# Patient Record
Sex: FEMALE | Race: BLACK OR AFRICAN AMERICAN | NOT HISPANIC OR LATINO | URBAN - METROPOLITAN AREA
[De-identification: names, ages, dates, MRNs, and addresses within clinical notes are randomized per-mention and may not be internally consistent; named-entity substitution may affect disease eponyms.]

---

## 2021-05-27 ENCOUNTER — OFFICE VISIT (OUTPATIENT)
Dept: URBAN - METROPOLITAN AREA CLINIC 40 | Facility: CLINIC | Age: 81
End: 2021-05-27
Payer: MEDICARE

## 2021-05-27 ENCOUNTER — LAB OUTSIDE AN ENCOUNTER (OUTPATIENT)
Dept: URBAN - METROPOLITAN AREA CLINIC 40 | Facility: CLINIC | Age: 81
End: 2021-05-27

## 2021-05-27 ENCOUNTER — DASHBOARD ENCOUNTERS (OUTPATIENT)
Age: 81
End: 2021-05-27

## 2021-05-27 DIAGNOSIS — Z79.01 BLOOD THINNED DUE TO LONG-TERM ANTICOAGULANT USE: ICD-10-CM

## 2021-05-27 DIAGNOSIS — I48.0 PAROXYSMAL ATRIAL FIBRILLATION: ICD-10-CM

## 2021-05-27 DIAGNOSIS — K76.9 LIVER LESION: ICD-10-CM

## 2021-05-27 DIAGNOSIS — K62.5 RECTAL BLEEDING: ICD-10-CM

## 2021-05-27 DIAGNOSIS — R19.5 HEME POSITIVE STOOL: ICD-10-CM

## 2021-05-27 DIAGNOSIS — K76.89 ABNORMAL FINDING ON LIVER FUNCTION: ICD-10-CM

## 2021-05-27 PROBLEM — 282825002: Status: ACTIVE | Noted: 2021-05-27

## 2021-05-27 PROBLEM — 300331000: Status: ACTIVE | Noted: 2021-05-27

## 2021-05-27 PROBLEM — 711150003: Status: ACTIVE | Noted: 2021-05-27

## 2021-05-27 PROCEDURE — 99204 OFFICE O/P NEW MOD 45 MIN: CPT | Performed by: INTERNAL MEDICINE

## 2021-05-27 PROCEDURE — 99244 OFF/OP CNSLTJ NEW/EST MOD 40: CPT | Performed by: INTERNAL MEDICINE

## 2021-05-27 RX ORDER — FUROSEMIDE 40 MG/1
1 TABLET TABLET ORAL ONCE A DAY
Status: ACTIVE | COMMUNITY

## 2021-05-27 RX ORDER — CLOPIDOGREL BISULFATE 75 MG/1
1 TABLET TABLET ORAL ONCE A DAY
Status: ACTIVE | COMMUNITY

## 2021-05-27 RX ORDER — ASPIRIN 81 MG/1
1 TABLET TABLET, COATED ORAL ONCE A DAY
Status: ACTIVE | COMMUNITY

## 2021-05-27 RX ORDER — CLONIDINE HYDROCHLORIDE 0.3 MG/1
1 TABLET TABLET ORAL ONCE A DAY
Status: ACTIVE | COMMUNITY

## 2021-05-27 RX ORDER — CYCLOBENZAPRINE HYDROCHLORIDE 10 MG/1
1 TABLET AT BEDTIME AS NEEDED TABLET, FILM COATED ORAL ONCE A DAY
Status: DISCONTINUED | COMMUNITY

## 2021-05-27 RX ORDER — METHIMAZOLE 10 MG/1
1 TABLET TABLET ORAL ONCE A DAY
Status: ACTIVE | COMMUNITY

## 2021-05-27 NOTE — HPI-TODAY'S VISIT:
PT referred by Dr. Terese Yoo. A copy of this note will be provided for review. CBC and Ferritin and MCV normal 3/21. CBC last week normal. When she went to the bathroom this morning, she had bright red blood diarrhea on the stool. She saw blood when she wiped. Since then, she went to the bathroom several times. She called her GI , but the earliest appointment was in June.   Hx benign liver lesion 2019.  ---- TECHNIQUE: Following IV administration of 82 mL of Omnipaque 350, CT scan of the abdomen and pelvis with multiplanar reformatted images generated from the data set. Dose reduction techniques were utilized.    COMPARISON: No comparison studies are available at this time.   FINDINGS:    Lower chest: No evidence of abnormality.   Liver: There is a 1.5 cm simple cyst in segment four. There is a tiny hypodensity at the hepatic dome which is indeterminate; image 13. There is a 2.3 x 0.9 cm hypoattenuating lesion in the lesion in The inferior lobe which is slightly higher in attenuation than simple fluid.   Gallbladder: The gallbladder is surgically absent.    Pancreas: Normal.    Spleen: Normal. Splenule noted.   Adrenals: Normal.   Kidneys: There is an 8 mm fat density lesion with minimal enhancement in the posterior right kidney which may represent an angiomyolipoma or a cortical scar. There is a tiny hypodensity in the lower pole of the right kidney which is too small to  characterize however this is statistically likely to represent a cyst. The left kidney appears normal.   Gastrointestinal: The stomach and small bowel appear normal. There is diverticulosis without evidence of diverticulitis.  The appendix is normal in appearance.   Pelvis: The uterus is surgically absent. The urinary bladder appears normal. The ovaries appear normal.   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 There are calcifications at the origin of the more cephalad right renal artery. There is nonflow limiting calcification at the origin of the superior mesenteric artery. There are calcifications of the  left renal artery likely without any hemodynamic significance. There is ectasia of the infrarenal abdominal aorta with a maximum diameter of 2.8 cm. There is a polypoid intraluminal structure at the junction of the thoracic and abdominal aorta; series 6,  image 47.   Lymph nodes: No enlarged lymph nodes.    Bones: No destructive osseous lesion. There are severe degenerative changes of the pubic symphysis. There are mild degenerative changes of both hips; right greater than left. There are scattered mild degenerative changes of the thoracolumbar spine. There  are moderate severe degenerative changes of the sacroiliac joints bilaterally.   IMPRESSION: .         .   Polypoid structure within the aorta at the junction of the abdomen and thorax. This likely represents an atypical presentation of noncalcified atheromatous disease, however the diagnosis of of an uncommon tumor such as an angiosarcoma cannot be excluded.   2.3 x 1.0 cm cystic lesion in the medial/inferior aspect of the right lobe which is slightly higher in density than simple fluid. This is likely benign however further characterization with MRI, ultrasound, or triple phase CT should be considered.   Diverticulosis without evidence of diverticulitis.   The Results Reporting Office (F1) will complete appropriate follow-up actions based on defined processes. F1   Released By: JERAMY REILLY MD 9/6/2019 12:21 PM ---- EXAM:   MRI ABDOMEN W+WO IV CONTRAST(CREATININE DRAW IF NEEDED)   CLINICAL INDICATION:  R63.4 (Abnormal weight loss) .  Liver lesion on CT . TECHNIQUE:  Multiplanar multisequence images were obtained through the abdomen without and with IV administration of 5 mL MultiHance.   COMPARISON: 9/6/2019   FINDINGS:     Lower chest:  No evidence of abnormality. Lungs not well assessed with this modality.   Liver:  Multiple T2 bright hepatic lesions show no evidence of enhancement on postcontrast imaging consistent with cysts. The lesion previously noted in the medial aspect of the right lower liver is consistent with a cyst measuring around 20 x 13 mm.   Gallbladder:  Surgically absent. Bile duct is within normal size limits for the postcholecystectomy patient.    Pancreas: Normal.    Spleen:  Motion artifact but no evidence of worrisome lesion.   Adrenals:  Normal.   Kidneys:  Small Bosniak 1 cyst in the right kidney. No worrisome renal lesions.   Gastrointestinal:  No evidence of abnormality. Exam not optimized for bowel assessment.     Vasculature:  Atherosclerotic vascular disease with some ulcerated plaque in the aorta. Abdominal aorta measures up to 26 mm. Portal vein, splenic vein and superior mesenteric veins enhance normally.   Lymph nodes:  No enlarged lymph nodes.    Bones:  No suspicious lesion present.   IMPRESSION: .         .   Hepatic and renal cysts. No evidence of worrisome lesion.       Released By: LORRIE MORENO MD 1/28/2020 11:32 AM

## 2021-05-27 NOTE — PHYSICAL EXAM CONSTITUTIONAL:
well developed, well nourished , in no acute distress , ambulating without difficulty , normal communication ability
Clear

## 2021-05-28 ENCOUNTER — TELEPHONE ENCOUNTER (OUTPATIENT)
Dept: URBAN - METROPOLITAN AREA CLINIC 40 | Facility: CLINIC | Age: 81
End: 2021-05-28

## 2021-07-09 ENCOUNTER — OFFICE VISIT (OUTPATIENT)
Dept: URBAN - METROPOLITAN AREA SURGERY CENTER 30 | Facility: SURGERY CENTER | Age: 81
End: 2021-07-09
Payer: MEDICARE

## 2021-07-09 DIAGNOSIS — K55.20 ANGIODYSPLASIA: ICD-10-CM

## 2021-07-09 DIAGNOSIS — K62.5 ANAL BLEEDING: ICD-10-CM

## 2021-07-09 PROCEDURE — 45378 DIAGNOSTIC COLONOSCOPY: CPT | Performed by: INTERNAL MEDICINE

## 2021-07-09 PROCEDURE — G8907 PT DOC NO EVENTS ON DISCHARG: HCPCS | Performed by: INTERNAL MEDICINE

## 2021-08-16 ENCOUNTER — OFFICE VISIT (OUTPATIENT)
Dept: URBAN - METROPOLITAN AREA CLINIC 40 | Facility: CLINIC | Age: 81
End: 2021-08-16